# Patient Record
Sex: MALE | URBAN - METROPOLITAN AREA
[De-identification: names, ages, dates, MRNs, and addresses within clinical notes are randomized per-mention and may not be internally consistent; named-entity substitution may affect disease eponyms.]

---

## 2022-03-30 ENCOUNTER — APPOINTMENT (RX ONLY)
Dept: URBAN - METROPOLITAN AREA CLINIC 307 | Facility: CLINIC | Age: 72
Setting detail: DERMATOLOGY
End: 2022-03-30

## 2022-03-30 ENCOUNTER — RX ONLY (OUTPATIENT)
Age: 72
Setting detail: RX ONLY
End: 2022-03-30

## 2022-03-30 DIAGNOSIS — L30.9 DERMATITIS, UNSPECIFIED: ICD-10-CM | Status: INADEQUATELY CONTROLLED

## 2022-03-30 PROCEDURE — ? PRESCRIPTION

## 2022-03-30 PROCEDURE — ? MEDICATION COUNSELING

## 2022-03-30 PROCEDURE — ? COUNSELING

## 2022-03-30 PROCEDURE — ? PRESCRIPTION MEDICATION MANAGEMENT

## 2022-03-30 PROCEDURE — 99204 OFFICE O/P NEW MOD 45 MIN: CPT

## 2022-03-30 PROCEDURE — ? FULL BODY SKIN EXAM - DECLINED

## 2022-03-30 RX ORDER — TACROLIMUS 1 MG/G
OINTMENT TOPICAL
Qty: 100 | Refills: 3

## 2022-03-30 RX ORDER — TRIAMCINOLONE ACETONIDE 1 MG/G
OINTMENT TOPICAL
Qty: 454 | Refills: 5

## 2022-03-30 RX ORDER — TACROLIMUS 1 MG/G
OINTMENT TOPICAL
Qty: 100 | Refills: 3 | Status: ERX | COMMUNITY
Start: 2022-03-30

## 2022-03-30 RX ORDER — TRIAMCINOLONE ACETONIDE 1 MG/G
OINTMENT TOPICAL
Qty: 454 | Refills: 5 | Status: ERX | COMMUNITY
Start: 2022-03-30

## 2022-03-30 RX ADMIN — TRIAMCINOLONE ACETONIDE: 1 OINTMENT TOPICAL at 00:00

## 2022-03-30 RX ADMIN — TACROLIMUS: 1 OINTMENT TOPICAL at 00:00

## 2022-03-30 ASSESSMENT — LOCATION SIMPLE DESCRIPTION DERM
LOCATION SIMPLE: RIGHT HAND
LOCATION SIMPLE: LEFT HAND
LOCATION SIMPLE: LEFT HAND
LOCATION SIMPLE: LEFT UPPER ARM
LOCATION SIMPLE: RIGHT HAND

## 2022-03-30 ASSESSMENT — LOCATION DETAILED DESCRIPTION DERM
LOCATION DETAILED: LEFT RADIAL DORSAL HAND
LOCATION DETAILED: RIGHT RADIAL DORSAL HAND
LOCATION DETAILED: LEFT RADIAL DORSAL HAND
LOCATION DETAILED: LEFT DISTAL LATERAL POSTERIOR UPPER ARM
LOCATION DETAILED: LEFT ULNAR DORSAL HAND
LOCATION DETAILED: RIGHT ULNAR DORSAL HAND
LOCATION DETAILED: RIGHT RADIAL DORSAL HAND

## 2022-03-30 ASSESSMENT — LOCATION ZONE DERM
LOCATION ZONE: HAND
LOCATION ZONE: ARM
LOCATION ZONE: HAND

## 2022-03-30 NOTE — PROCEDURE: MIPS QUALITY
Quality 110: Preventive Care And Screening: Influenza Immunization: Influenza Immunization not Administered because Patient Refused.
Quality 226: Preventive Care And Screening: Tobacco Use: Screening And Cessation Intervention: Patient screened for tobacco use and is an ex/non-smoker
Detail Level: Detailed
Quality 431: Preventive Care And Screening: Unhealthy Alcohol Use - Screening: Patient not identified as an unhealthy alcohol user when screened for unhealthy alcohol use using a systematic screening method
Quality 402: Tobacco Use And Help With Quitting Among Adolescents: Patient screened for tobacco and never smoked

## 2022-03-30 NOTE — PROCEDURE: PRESCRIPTION MEDICATION MANAGEMENT
Render In Strict Bullet Format?: No
Detail Level: Zone
Initiate Treatment: \\nTriamcinolone 0.1% ointment BID x 2 weeks, then switch to Tacrolimus 0.1% ointment BID x 2 weeks, and repeat PRN. Recommended cotton gloves QHS.

## 2022-03-30 NOTE — HPI: RASH
What Type Of Note Output Would You Prefer (Optional)?: Standard Output
Is The Patient Presenting As Previously Scheduled?: Yes
How Severe Is Your Rash?: mild
Is This A New Presentation, Or A Follow-Up?: Rash
Additional History: Had also had flares on lower legs.

## 2022-05-11 ENCOUNTER — APPOINTMENT (RX ONLY)
Dept: URBAN - METROPOLITAN AREA CLINIC 307 | Facility: CLINIC | Age: 72
Setting detail: DERMATOLOGY
End: 2022-05-11

## 2022-05-11 DIAGNOSIS — L82.0 INFLAMED SEBORRHEIC KERATOSIS: ICD-10-CM

## 2022-05-11 DIAGNOSIS — L30.9 DERMATITIS, UNSPECIFIED: ICD-10-CM | Status: INADEQUATELY CONTROLLED

## 2022-05-11 PROCEDURE — ? PRESCRIPTION

## 2022-05-11 PROCEDURE — 17110 DESTRUCTION B9 LES UP TO 14: CPT

## 2022-05-11 PROCEDURE — ? PRESCRIPTION MEDICATION MANAGEMENT

## 2022-05-11 PROCEDURE — ? LIQUID NITROGEN

## 2022-05-11 PROCEDURE — ? FULL BODY SKIN EXAM - DECLINED

## 2022-05-11 PROCEDURE — 99214 OFFICE O/P EST MOD 30 MIN: CPT | Mod: 25

## 2022-05-11 PROCEDURE — ? COUNSELING

## 2022-05-11 PROCEDURE — ? MEDICATION COUNSELING

## 2022-05-11 RX ORDER — CLOBETASOL PROPIONATE 0.5 MG/G
OINTMENT TOPICAL
Qty: 30 | Refills: 0 | COMMUNITY
Start: 2022-05-11

## 2022-05-11 RX ADMIN — CLOBETASOL PROPIONATE: 0.5 OINTMENT TOPICAL at 00:00

## 2022-05-11 ASSESSMENT — LOCATION ZONE DERM
LOCATION ZONE: FACE
LOCATION ZONE: HAND
LOCATION ZONE: SCALP
LOCATION ZONE: ARM
LOCATION ZONE: HAND

## 2022-05-11 ASSESSMENT — LOCATION DETAILED DESCRIPTION DERM
LOCATION DETAILED: RIGHT ULNAR DORSAL HAND
LOCATION DETAILED: RIGHT RADIAL DORSAL HAND
LOCATION DETAILED: RIGHT LATERAL FOREHEAD
LOCATION DETAILED: RIGHT RADIAL DORSAL HAND
LOCATION DETAILED: LEFT RADIAL DORSAL HAND
LOCATION DETAILED: RIGHT CENTRAL POSTAURICULAR SKIN
LOCATION DETAILED: LEFT LATERAL FOREHEAD
LOCATION DETAILED: LEFT CENTRAL POSTAURICULAR SKIN
LOCATION DETAILED: LEFT DISTAL LATERAL POSTERIOR UPPER ARM
LOCATION DETAILED: LEFT RADIAL DORSAL HAND
LOCATION DETAILED: LEFT ULNAR DORSAL HAND

## 2022-05-11 ASSESSMENT — LOCATION SIMPLE DESCRIPTION DERM
LOCATION SIMPLE: RIGHT HAND
LOCATION SIMPLE: LEFT FOREHEAD
LOCATION SIMPLE: RIGHT FOREHEAD
LOCATION SIMPLE: SCALP
LOCATION SIMPLE: LEFT UPPER ARM
LOCATION SIMPLE: LEFT HAND
LOCATION SIMPLE: LEFT HAND
LOCATION SIMPLE: RIGHT HAND

## 2022-05-11 NOTE — PROCEDURE: LIQUID NITROGEN
Post-Care Instructions: I reviewed with the patient in detail post-care instructions. Patient is to wear sunprotection, and avoid picking at any of the treated lesions. Pt may apply Vaseline to crusted or scabbing areas.
Show Spray Paint Technique Variable?: Yes
Medical Necessity Clause: This procedure was medically necessary because the lesions that were treated were:
Render Note In Bullet Format When Appropriate: No
Medical Necessity Information: It is in your best interest to select a reason for this procedure from the list below. All of these items fulfill various CMS LCD requirements except the new and changing color options.
Detail Level: Detailed
Consent: The patient's consent was obtained including but not limited to risks of crusting, scabbing, blistering, scarring, darker or lighter pigmentary change, recurrence, incomplete removal and infection.
Spray Paint Text: The liquid nitrogen was applied to the skin utilizing a spray paint frosting technique.

## 2022-05-11 NOTE — PROCEDURE: PRESCRIPTION MEDICATION MANAGEMENT
Render In Strict Bullet Format?: No
Discontinue Regimen: Triamcinolone
Detail Level: Zone
Initiate Treatment: Clobetasol ointment BID x 2 weeks, then stop x 2 weeks, and repeat PRN. Recommended cotton gloves QHS.

## 2022-06-29 ENCOUNTER — APPOINTMENT (RX ONLY)
Dept: URBAN - METROPOLITAN AREA CLINIC 307 | Facility: CLINIC | Age: 72
Setting detail: DERMATOLOGY
End: 2022-06-29

## 2022-06-29 DIAGNOSIS — L30.9 DERMATITIS, UNSPECIFIED: ICD-10-CM | Status: WELL CONTROLLED

## 2022-06-29 DIAGNOSIS — B07.8 OTHER VIRAL WARTS: ICD-10-CM | Status: STABLE

## 2022-06-29 PROCEDURE — ? FULL BODY SKIN EXAM - DECLINED

## 2022-06-29 PROCEDURE — 17110 DESTRUCTION B9 LES UP TO 14: CPT

## 2022-06-29 PROCEDURE — ? PRESCRIPTION MEDICATION MANAGEMENT

## 2022-06-29 PROCEDURE — ? MEDICATION COUNSELING

## 2022-06-29 PROCEDURE — ? COUNSELING

## 2022-06-29 PROCEDURE — 99213 OFFICE O/P EST LOW 20 MIN: CPT | Mod: 25

## 2022-06-29 PROCEDURE — ? LIQUID NITROGEN

## 2022-06-29 ASSESSMENT — LOCATION ZONE DERM
LOCATION ZONE: SCALP
LOCATION ZONE: HAND
LOCATION ZONE: HAND
LOCATION ZONE: ARM

## 2022-06-29 ASSESSMENT — LOCATION DETAILED DESCRIPTION DERM
LOCATION DETAILED: LEFT ULNAR DORSAL HAND
LOCATION DETAILED: LEFT RADIAL DORSAL HAND
LOCATION DETAILED: RIGHT RADIAL DORSAL HAND
LOCATION DETAILED: RIGHT CENTRAL FRONTAL SCALP
LOCATION DETAILED: RIGHT RADIAL DORSAL HAND
LOCATION DETAILED: LEFT RADIAL DORSAL HAND
LOCATION DETAILED: RIGHT ULNAR DORSAL HAND
LOCATION DETAILED: LEFT DISTAL LATERAL POSTERIOR UPPER ARM

## 2022-06-29 ASSESSMENT — LOCATION SIMPLE DESCRIPTION DERM
LOCATION SIMPLE: RIGHT HAND
LOCATION SIMPLE: LEFT HAND
LOCATION SIMPLE: LEFT HAND
LOCATION SIMPLE: SCALP
LOCATION SIMPLE: RIGHT HAND
LOCATION SIMPLE: LEFT UPPER ARM

## 2022-06-29 NOTE — PROCEDURE: MEDICATION COUNSELING
Pain Refusal Text: I offered to prescribe pain medication but the patient refused to take this medication. Odomzo Counseling- I discussed with the patient the risks of Odomzo including but not limited to nausea, vomiting, diarrhea, constipation, weight loss, changes in the sense of taste, decreased appetite, muscle spasms, and hair loss.  The patient verbalized understanding of the proper use and possible adverse effects of Odomzo.  All of the patient's questions and concerns were addressed.

## 2022-06-29 NOTE — PROCEDURE: LIQUID NITROGEN
Detail Level: Detailed
Include Z78.9 (Other Specified Conditions Influencing Health Status) As An Associated Diagnosis?: No
Number Of Freeze-Thaw Cycles: 3 freeze-thaw cycles
Medical Necessity Information: It is in your best interest to select a reason for this procedure from the list below. All of these items fulfill various CMS LCD requirements except the new and changing color options.
Show Applicator Variable?: Yes
Medical Necessity Clause: This procedure was medically necessary because the lesions that were treated were:
Application Tool (Optional): Liquid Nitrogen Sprayer
Post-Care Instructions: I reviewed with the patient in detail post-care instructions. Patient is to wear sunprotection, and avoid picking at any of the treated lesions. Pt may apply Vaseline to crusted or scabbing areas.
Consent: The patient's consent was obtained including but not limited to risks of crusting, scabbing, blistering, scarring, darker or lighter pigmentary change, recurrence, incomplete removal and infection.
Spray Paint Text: The liquid nitrogen was applied to the skin utilizing a spray paint frosting technique.

## 2022-06-29 NOTE — PROCEDURE: PRESCRIPTION MEDICATION MANAGEMENT
Continue Regimen: Clobetasol ointment BID for up to 2 weeks/month PRN for flares
Render In Strict Bullet Format?: No
Detail Level: Zone

## 2024-08-19 NOTE — PROCEDURE: MEDICATION COUNSELING
Medication: levothyroxine 75mcg passed protocol.   Last office visit date: 02/14/2024  Next appointment scheduled?: Yes   Number of refills given: 1   Cibinqo Counseling: I discussed with the patient the risks of Cibinqo therapy including but not limited to common cold, nausea, headache, cold sores, increased blood CPK levels, dizziness, UTIs, fatigue, acne, and vomitting. Live vaccines should be avoided.  This medication has been linked to serious infections; higher rate of mortality; malignancy and lymphoproliferative disorders; major adverse cardiovascular events; thrombosis; thrombocytopenia and lymphopenia; lipid elevations; and retinal detachment.

## 2025-02-25 ENCOUNTER — APPOINTMENT (OUTPATIENT)
Dept: URBAN - METROPOLITAN AREA CLINIC 307 | Facility: CLINIC | Age: 75
Setting detail: DERMATOLOGY
End: 2025-02-25

## 2025-02-25 DIAGNOSIS — L30.9 DERMATITIS, UNSPECIFIED: ICD-10-CM | Status: INADEQUATELY CONTROLLED

## 2025-02-25 PROCEDURE — ? PRESCRIPTION MEDICATION MANAGEMENT

## 2025-02-25 PROCEDURE — ? PRESCRIPTION

## 2025-02-25 PROCEDURE — 99214 OFFICE O/P EST MOD 30 MIN: CPT

## 2025-02-25 PROCEDURE — ? COUNSELING

## 2025-02-25 RX ORDER — CLOBETASOL PROPIONATE 0.5 MG/G
OINTMENT TOPICAL
Qty: 60 | Refills: 3 | Status: ERX

## 2025-02-25 ASSESSMENT — LOCATION SIMPLE DESCRIPTION DERM
LOCATION SIMPLE: RIGHT THUMB
LOCATION SIMPLE: LEFT HAND

## 2025-02-25 ASSESSMENT — LOCATION ZONE DERM
LOCATION ZONE: FINGER
LOCATION ZONE: HAND

## 2025-02-25 ASSESSMENT — LOCATION DETAILED DESCRIPTION DERM
LOCATION DETAILED: LEFT RADIAL DORSAL HAND
LOCATION DETAILED: 1ST WEB SPACE RIGHT HAND

## 2025-02-25 ASSESSMENT — BSA RASH: BSA RASH: 5

## 2025-02-25 ASSESSMENT — ITCH NUMERIC RATING SCALE: HOW SEVERE IS YOUR ITCHING?: 5

## 2025-02-25 NOTE — PROCEDURE: PRESCRIPTION MEDICATION MANAGEMENT
Detail Level: Zone
Render In Strict Bullet Format?: No
Discontinue Regimen: Fluocinonide (given by PCP)
Initiate Treatment: Clobetasol 0.05% ointment BID for up to 2 weeks per month.

## 2025-05-18 NOTE — PROCEDURE: MEDICATION COUNSELING
done Cellcept Counseling:  I discussed with the patient the risks of mycophenolate mofetil including but not limited to infection/immunosuppression, GI upset, hypokalemia, hypercholesterolemia, bone marrow suppression, lymphoproliferative disorders, malignancy, GI ulceration/bleed/perforation, colitis, interstitial lung disease, kidney failure, progressive multifocal leukoencephalopathy, and birth defects.  The patient understands that monitoring is required including a baseline creatinine and regular CBC testing. In addition, patient must alert us immediately if symptoms of infection or other concerning signs are noted.